# Patient Record
Sex: FEMALE | Race: WHITE
[De-identification: names, ages, dates, MRNs, and addresses within clinical notes are randomized per-mention and may not be internally consistent; named-entity substitution may affect disease eponyms.]

---
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
## 2018-02-28 ENCOUNTER — HOSPITAL ENCOUNTER (OUTPATIENT)
Dept: HOSPITAL 62 - OD | Age: 18
End: 2018-02-28
Payer: MEDICAID

## 2018-02-28 DIAGNOSIS — M25.561: ICD-10-CM

## 2018-02-28 DIAGNOSIS — M25.571: Primary | ICD-10-CM

## 2018-02-28 NOTE — RADIOLOGY REPORT (SQ)
EXAM DESCRIPTION:  KNEE RIGHT 3 VIEWS



COMPLETED DATE/TIME:  2/28/2018 4:10 pm



REASON FOR STUDY:  PAIN IN RIGHT KNEE M25.561  PAIN IN RIGHT KNEE M25.571  PAIN IN RIGHT ANKLE AND ALEJO
INTS OF RIGHT FOOT



COMPARISON:  None.



NUMBER OF VIEWS:  Three views.



TECHNIQUE:  AP, lateral, and sunrise patella radiographic images acquired of the right knee.



LIMITATIONS:  None.



FINDINGS:  MINERALIZATION: Normal.

BONES: No acute fracture or dislocation.  No worrisome bone lesions.

JOINT: No effusion.

SOFT TISSUES: No soft tissue swelling.  No radio-opaque foreign body.

OTHER: No other significant finding.



IMPRESSION:  NEGATIVE STUDY OF THE RIGHT KNEE. NO RADIOGRAPHIC EVIDENCE OF ACUTE INJURY.



TECHNICAL DOCUMENTATION:  JOB ID:  [id number]

 2011 [employer]- All Rights Reserved



Reading location - IP/workstation name: [email or web address]

## 2018-02-28 NOTE — RADIOLOGY REPORT (SQ)
EXAM DESCRIPTION:  ANKLE RIGHT COMPLETE



COMPLETED DATE/TIME:  2/28/2018 4:10 pm



REASON FOR STUDY:  PAIN IN RIGHT ANKLE AND JOINTS OF RIGHT FOOT M25.561  PAIN IN RIGHT KNEE M25.571  
PAIN IN RIGHT ANKLE AND JOINTS OF RIGHT FOOT



COMPARISON:  None.



NUMBER OF VIEWS:  Three views.



TECHNIQUE:  AP, lateral, and oblique radiographic images acquired of the right ankle.



LIMITATIONS:  None.



FINDINGS:  MINERALIZATION: Normal.

BONES: No acute fracture or dislocation.  No worrisome bone lesions.

JOINTS: No effusions.

SOFT TISSUES: No soft tissue swelling. No foreign body.

OTHER: No other significant finding.



IMPRESSION:  NEGATIVE STUDY OF THE RIGHT ANKLE. NO RADIOGRAPHIC EVIDENCE OF ACUTE INJURY.



TECHNICAL DOCUMENTATION:  JOB ID:  [id number]

 2011 [employer]- All Rights Reserved



Reading location - IP/workstation name: [email or web address]

## 2018-05-18 ENCOUNTER — HOSPITAL ENCOUNTER (OUTPATIENT)
Dept: HOSPITAL 62 - OD | Age: 18
End: 2018-05-18
Attending: NURSE PRACTITIONER
Payer: MEDICAID

## 2018-05-18 DIAGNOSIS — N91.2: Primary | ICD-10-CM

## 2018-05-18 PROCEDURE — 36415 COLL VENOUS BLD VENIPUNCTURE: CPT

## 2018-05-18 PROCEDURE — 84703 CHORIONIC GONADOTROPIN ASSAY: CPT

## 2019-11-19 ENCOUNTER — HOSPITAL ENCOUNTER (EMERGENCY)
Dept: HOSPITAL 62 - ER | Age: 19
Discharge: HOME | End: 2019-11-19
Payer: MEDICARE

## 2019-11-19 VITALS — SYSTOLIC BLOOD PRESSURE: 137 MMHG | DIASTOLIC BLOOD PRESSURE: 56 MMHG

## 2019-11-19 DIAGNOSIS — E11.9: ICD-10-CM

## 2019-11-19 DIAGNOSIS — D64.9: ICD-10-CM

## 2019-11-19 DIAGNOSIS — Z91.048: ICD-10-CM

## 2019-11-19 DIAGNOSIS — N39.0: ICD-10-CM

## 2019-11-19 DIAGNOSIS — J45.909: ICD-10-CM

## 2019-11-19 DIAGNOSIS — R05: ICD-10-CM

## 2019-11-19 DIAGNOSIS — R30.0: ICD-10-CM

## 2019-11-19 DIAGNOSIS — R11.2: ICD-10-CM

## 2019-11-19 DIAGNOSIS — J06.9: ICD-10-CM

## 2019-11-19 DIAGNOSIS — R51: Primary | ICD-10-CM

## 2019-11-19 LAB
ADD MANUAL DIFF: NO
ALBUMIN SERPL-MCNC: 4 G/DL (ref 3.5–5)
ALP SERPL-CCNC: 95 U/L (ref 38–126)
ANION GAP SERPL CALC-SCNC: 12 MMOL/L (ref 5–19)
APPEARANCE UR: (no result)
APTT PPP: (no result) S
AST SERPL-CCNC: 25 U/L (ref 14–36)
BASOPHILS # BLD AUTO: 0.1 10^3/UL (ref 0–0.2)
BASOPHILS NFR BLD AUTO: 1.4 % (ref 0–2)
BILIRUB DIRECT SERPL-MCNC: 0.1 MG/DL (ref 0–0.4)
BILIRUB SERPL-MCNC: 0.4 MG/DL (ref 0.2–1.3)
BILIRUB UR QL STRIP: NEGATIVE
BUN SERPL-MCNC: 11 MG/DL (ref 7–20)
CALCIUM: 9.2 MG/DL (ref 8.4–10.2)
CHLORIDE SERPL-SCNC: 105 MMOL/L (ref 98–107)
CO2 SERPL-SCNC: 19 MMOL/L (ref 22–30)
EOSINOPHIL # BLD AUTO: 0 10^3/UL (ref 0–0.6)
EOSINOPHIL NFR BLD AUTO: 0 % (ref 0–6)
ERYTHROCYTE [DISTWIDTH] IN BLOOD BY AUTOMATED COUNT: 16.2 % (ref 11.5–14)
GLUCOSE SERPL-MCNC: 83 MG/DL (ref 75–110)
GLUCOSE UR STRIP-MCNC: NEGATIVE MG/DL
HCT VFR BLD CALC: 31.3 % (ref 36–47)
HGB BLD-MCNC: 9.7 G/DL (ref 12–15.5)
KETONES UR STRIP-MCNC: NEGATIVE MG/DL
LYMPHOCYTES # BLD AUTO: 0.4 10^3/UL (ref 0.5–4.7)
LYMPHOCYTES NFR BLD AUTO: 11 % (ref 13–45)
MCH RBC QN AUTO: 20.2 PG (ref 27–33.4)
MCHC RBC AUTO-ENTMCNC: 30.9 G/DL (ref 32–36)
MCV RBC AUTO: 65 FL (ref 80–97)
MONOCYTES # BLD AUTO: 0.7 10^3/UL (ref 0.1–1.4)
MONOCYTES NFR BLD AUTO: 17.8 % (ref 3–13)
NEUTROPHILS # BLD AUTO: 2.8 10^3/UL (ref 1.7–8.2)
NEUTS SEG NFR BLD AUTO: 69.8 % (ref 42–78)
NITRITE UR QL STRIP: NEGATIVE
PH UR STRIP: 5 [PH] (ref 5–9)
PLATELET # BLD: 201 10^3/UL (ref 150–450)
POTASSIUM SERPL-SCNC: 4.4 MMOL/L (ref 3.6–5)
PROT SERPL-MCNC: 7.3 G/DL (ref 6.3–8.2)
PROT UR STRIP-MCNC: 30 MG/DL
RBC # BLD AUTO: 4.79 10^6/UL (ref 3.72–5.28)
SP GR UR STRIP: 1.03
TOTAL CELLS COUNTED % (AUTO): 100 %
UROBILINOGEN UR-MCNC: NEGATIVE MG/DL (ref ?–2)
WBC # BLD AUTO: 3.9 10^3/UL (ref 4–10.5)

## 2019-11-19 PROCEDURE — 93005 ELECTROCARDIOGRAM TRACING: CPT

## 2019-11-19 PROCEDURE — 96361 HYDRATE IV INFUSION ADD-ON: CPT

## 2019-11-19 PROCEDURE — 96365 THER/PROPH/DIAG IV INF INIT: CPT

## 2019-11-19 PROCEDURE — 36415 COLL VENOUS BLD VENIPUNCTURE: CPT

## 2019-11-19 PROCEDURE — 96375 TX/PRO/DX INJ NEW DRUG ADDON: CPT

## 2019-11-19 PROCEDURE — 81001 URINALYSIS AUTO W/SCOPE: CPT

## 2019-11-19 PROCEDURE — 99284 EMERGENCY DEPT VISIT MOD MDM: CPT

## 2019-11-19 PROCEDURE — 80053 COMPREHEN METABOLIC PANEL: CPT

## 2019-11-19 PROCEDURE — 85025 COMPLETE CBC W/AUTO DIFF WBC: CPT

## 2019-11-19 PROCEDURE — 93010 ELECTROCARDIOGRAM REPORT: CPT

## 2019-11-19 PROCEDURE — 71046 X-RAY EXAM CHEST 2 VIEWS: CPT

## 2019-11-19 PROCEDURE — 84703 CHORIONIC GONADOTROPIN ASSAY: CPT

## 2019-11-19 PROCEDURE — 87086 URINE CULTURE/COLONY COUNT: CPT

## 2019-11-19 NOTE — RADIOLOGY REPORT (SQ)
EXAM DESCRIPTION:  CHEST 2 VIEWS



COMPLETED DATE/TIME:  11/19/2019 5:33 pm



REASON FOR STUDY:  cough



COMPARISON:  2/19/2010



EXAM PARAMETERS:  NUMBER OF VIEWS: two views

TECHNIQUE: Digital Frontal and Lateral radiographic views of the chest acquired.

RADIATION DOSE: NA

LIMITATIONS: none



FINDINGS:  LUNGS AND PLEURA: No opacities, masses or pneumothorax. No pleural effusion.

MEDIASTINUM AND HILAR STRUCTURES: No masses or contour abnormalities.

HEART AND VASCULAR STRUCTURES: Heart normal size.  No evidence for failure.

BONES: No acute findings.

HARDWARE: None in the chest.

OTHER: No other significant finding.



IMPRESSION:  1. NO ACUTE RADIOGRAPHIC FINDING IN THE CHEST.



TECHNICAL DOCUMENTATION:  JOB ID:  [id number]

 2011 [employer]- All Rights Reserved



Reading location - IP/workstation name: [email or web address]

## 2019-11-19 NOTE — ER DOCUMENT REPORT
ED Medical Screen (RME)





- General


Chief Complaint: Headache


Stated Complaint: HEADACHE,NAUSEA


Time Seen by Provider: 11/19/19 16:11


Primary Care Provider: 


ENRIQUE CAMPOVERDE FNP-C [Primary Care Provider] - Follow up as needed


Notes: 





Patient is a 18-year-old female with a history of migraines who presents the 

emergency department with a chief complaint of a migraine.  Patient states that 

it started yesterday.  States that it is at her forehead just above both 

eyebrows.  Patient denies any loss of strength, numbness, tingling, or any other

symptoms at this time.  Patient has had some nausea, vomiting, dizziness, and 

blurred vision.  Patient denies any chest pain, abdominal pain, or any other 

symptoms.  Patient has a history of ADHD and pots.





Exam: S1, S2.  Normal rate.  5 out of 5 strength in all extremities.





I have greeted and performed a rapid initial assessment of this patient.  A 

comprehensive ED assessment and evaluation of the patient, analysis of test 

results and completion of medical decision making process will be conducted by 

an additional ED providers.


TRAVEL OUTSIDE OF THE [place] IN LAST 30 DAYS: No





- Related Data


Allergies/Adverse Reactions: 


                                        





adhesive [Adhesive] Allergy (Mild, Verified 11/19/19 16:03)


   rash


datrona patch Allergy (Mild, Uncoded 11/19/19 16:03)


   rash











Past Medical History





- Social History


Chew tobacco use (# tins/day): No


Frequency of alcohol use: None


Drug Abuse: None


Pulmonary Medical History: Reports: Hx Asthma


Neurological Medical History: Reports: Hx Migraine


Endocrine Medical History: Reports: Hx Diabetes Mellitus Type 2 - metformin, Hx 

Hypothyroidism


Renal/ Medical History: Denies: Hx Peritoneal Dialysis


GI Medical History: Reports: Hx Gastroesophageal Reflux Disease


Psychiatric Medical History: Reports: Hx Attention Deficit Hyperactivity 

Disorder, Hx Bipolar Disorder, Hx Depression


Past Surgical History: Reports: Hx Myringotomy, Hx Tonsillectomy





- Immunizations


Immunizations up to date: Yes


Hx Diphtheria, Pertussis, Tetanus Vaccination: No - immunization planned when 

she feels better





Physical Exam





- Vital signs


Vitals: 


                                        











Temp Pulse Resp BP Pulse Ox


 


 98.7 F   86   20   138/76 H  98 


 


 11/19/19 16:01  11/19/19 16:01  11/19/19 16:01  11/19/19 16:01  11/19/19 16:01














Course





- Vital Signs


Vital signs: 


                                        











Temp Pulse Resp BP Pulse Ox


 


 98.7 F   86   20   138/76 H  98 


 


 11/19/19 16:01  11/19/19 16:01  11/19/19 16:01  11/19/19 16:01  11/19/19 16:01














Doctor's Discharge





- Discharge


Referrals: 


ENRIQUE CAMPOVERDE FNP-C [Primary Care Provider] - Follow up as needed

## 2019-11-19 NOTE — ER DOCUMENT REPORT
ED General





- General


Chief Complaint: Headache


Stated Complaint: HEADACHE,NAUSEA


Time Seen by Provider: 11/19/19 16:11


Primary Care Provider: 


ENRIQUE CAMPOVERDE FNP-C [Primary Care Provider] - Follow up as needed


Mode of Arrival: Ambulatory


Information source: Patient


Notes: 





Patient presents complaining of frontal headache that started yesterday.  

Patient states she had nausea and vomiting x5 episodes today.  Patient denies 

any abdominal tenderness.  Patient denies any fever.  Patient does complain of 

cough for the past 3 days.  Patient also reports dysuria for the past 2 days.


TRAVEL OUTSIDE OF THE [place] IN LAST 30 DAYS: No





- HPI


Onset: Other - 3 days


Onset/Duration: Gradual


Quality of pain: Pressure


Pain Level: 5


Associated symptoms: Headache, Nausea, Vomiting.  denies: Chest pain, 

Nonproductive cough, Diarrhea, Fever, Shortness of breath


Exacerbated by: Denies


Relieved by: Denies


Similar symptoms previously: No


Recently seen / treated by doctor: No





- Related Data


Allergies/Adverse Reactions: 


                                        





adhesive [Adhesive] Allergy (Mild, Verified 11/19/19 16:03)


   rash


datrona patch Allergy (Mild, Uncoded 11/19/19 16:03)


   rash











Past Medical History





- General


Information source: Patient





- Social History


Smoking Status: Never Smoker


Chew tobacco use (# tins/day): No


Frequency of alcohol use: None


Drug Abuse: None


Occupation: dishwashing


Lives with: Family


Family History: Reviewed & Not Pertinent


Patient has suicidal ideation: No


Patient has homicidal ideation: No


Pulmonary Medical History: Reports: Hx Asthma


Neurological Medical History: Reports: Hx Migraine


Endocrine Medical History: Reports: Hx Diabetes Mellitus Type 2 - metformin, Hx 

Hypothyroidism


Renal/ Medical History: Denies: Hx Peritoneal Dialysis


GI Medical History: Reports: Hx Gastroesophageal Reflux Disease


Psychiatric Medical History: Reports: Hx Attention Deficit Hyperactivity 

Disorder, Hx Bipolar Disorder, Hx Depression


Past Surgical History: Reports: Hx Myringotomy, Hx Tonsillectomy





- Immunizations


Immunizations up to date: Yes


Hx Diphtheria, Pertussis, Tetanus Vaccination: No - immunization planned when 

she feels better





Review of Systems





- Review of Systems


Constitutional: No symptoms reported.  denies: Fever


EENT: No symptoms reported


Cardiovascular: No symptoms reported.  denies: Chest pain


Respiratory: Cough


Gastrointestinal: Nausea, Vomiting.  denies: Abdominal pain


Genitourinary: Dysuria


Female Genitourinary: No symptoms reported


Musculoskeletal: No symptoms reported.  denies: Back pain


Skin: No symptoms reported


Hematologic/Lymphatic: No symptoms reported


Neurological/Psychological: Headaches.  denies: Confusion, Weakness





Physical Exam





- Vital signs


Vitals: 


                                        











Temp Pulse Resp BP Pulse Ox


 


 98.7 F   86   20   138/76 H  98 


 


 11/19/19 16:01  11/19/19 16:01  11/19/19 16:01  11/19/19 16:01  11/19/19 16:01














- General


General appearance: Appears well, Alert


In distress: None





- HEENT


Head: Normocephalic, Atraumatic


Eyes: Normal


Conjunctiva: Normal


Extraocular movements intact: Yes


Eyelashes: Normal


Pupils: PERRL


Ears: Normal


External canal: Normal


Tympanic membrane: Normal


Nasal: Normal


Mouth/Lips: Normal


Mucous membranes: Normal


Pharynx: Normal.  No: Erythema, Exudate, Tonsillar hypertrophy


Neck: Normal, Supple.  No: Brudzinski, Lymphadenopathy, Meningismus





- Respiratory


Respiratory status: No respiratory distress


Chest status: Nontender


Breath sounds: Nonproductive cough


Chest palpation: Normal





- Cardiovascular


Rhythm: Regular.  No: Tachycardia


Heart sounds: S1 appreciated, S2 appreciated





- Abdominal


Inspection: Morbidly Obese


Distension: No distension


Bowel sounds: Normal


Tenderness: Nontender


Organomegaly: No organomegaly





- Back


Back: Normal, Nontender





- Extremities


General upper extremity: Normal inspection, Normal strength


General lower extremity: Normal inspection, Normal strength





- Neurological


Neuro grossly intact: Yes


Cognition: Normal


Orientation: AAOx4


Claudia Coma Scale Eye Opening: Spontaneous


Claudia Coma Scale Verbal: Oriented


Rake Coma Scale Motor: Obeys Commands


Rake Coma Scale Total: 15


Speech: Normal


Cranial nerves: Normal


Cerebellar coordination: Normal





- Psychological


Associated symptoms: Normal affect, Normal mood





- Skin


Skin Temperature: Warm


Skin Moisture: Dry


Skin Color: Normal





Course





- Re-evaluation


Re-evalutation: 





11/19/19 19:18


Patient reports that nausea and vomiting has resolved and headache pain is 

resolved as well.  Patient does have a UTI and was treated with antibiotics here

 and will be given a prescription to take at home.  Patient advised of 

incidental finding of anemia and encouraged to increase iron in her diet and to 

follow-up with her primary doctor for further evaluation of her anemia.  The 

patient presents with headache without signs of CNS bleed, stroke, infection, or

 other serious etiology.  The patient is neurologically intact.  Given the 

extremely low risk of these diagnoses further testing and evaluation for these 

possibilities does not appear to be indicated at this time.  The patient has 

been instructed to return if the symptoms worsen or change in any way.





- Vital Signs


Vital signs: 


                                        











Temp Pulse Resp BP Pulse Ox


 


 98.7 F   81   17   137/56 H  99 


 


 11/19/19 19:34  11/19/19 19:34  11/19/19 19:34  11/19/19 19:34  11/19/19 19:34














- Laboratory


Result Diagrams: 


                                 11/19/19 17:51





                                 11/19/19 17:51


Laboratory results interpreted by me: 


                                        











  11/19/19 11/19/19 11/19/19





  16:30 17:51 17:51


 


WBC   3.9 L 


 


Hgb   9.7 L 


 


Hct   31.3 L 


 


MCV   65 L 


 


MCH   20.2 L 


 


MCHC   30.9 L 


 


RDW   16.2 H 


 


Lymph % (Auto)   11.0 L 


 


Mono % (Auto)   17.8 H 


 


Absolute Lymphs (auto)   0.4 L 


 


Sodium    136.3 L


 


Carbon Dioxide    19 L


 


Urine Protein  30 H  


 


Ur Leukocyte Esterase  LARGE H  











11/19/19 19:17





                               Labs- Entire Visit











  11/19/19 11/19/19 11/19/19





  16:30 17:51 17:51


 


WBC   3.9 L 


 


RBC   4.79 


 


Hgb   9.7 L 


 


Hct   31.3 L 


 


MCV   65 L 


 


MCH   20.2 L 


 


MCHC   30.9 L 


 


RDW   16.2 H 


 


Plt Count   201 


 


Lymph % (Auto)   11.0 L 


 


Mono % (Auto)   17.8 H 


 


Eos % (Auto)   0.0 


 


Baso % (Auto)   1.4 


 


Absolute Neuts (auto)   2.8 


 


Absolute Lymphs (auto)   0.4 L 


 


Absolute Monos (auto)   0.7 


 


Absolute Eos (auto)   0.0 


 


Absolute Basos (auto)   0.1 


 


Seg Neutrophils %   69.8 


 


Sodium    136.3 L


 


Potassium    4.4


 


Chloride    105


 


Carbon Dioxide    19 L


 


Anion Gap    12


 


BUN    11


 


Creatinine    0.86


 


Est GFR ( Amer)    > 60


 


Est GFR (MDRD) Non-Af    > 60


 


Glucose    83


 


Calcium    9.2


 


Total Bilirubin    0.4


 


Direct Bilirubin    0.1


 


Neonat Total Bilirubin    Not Reportable


 


Neonat Direct Bilirubin    Not Reportable


 


Neonat Indirect Bili    Not Reportable


 


AST    25


 


ALT    20


 


Alkaline Phosphatase    95


 


Total Protein    7.3


 


Albumin    4.0


 


Serum HCG, Qual   


 


Urine Color  ARNOL  


 


Urine Appearance  CLOUDY  


 


Urine pH  5.0  


 


Ur Specific Gravity  1.028  


 


Urine Protein  30 H  


 


Urine Glucose (UA)  NEGATIVE  


 


Urine Ketones  NEGATIVE  


 


Urine Blood  NEGATIVE  


 


Urine Nitrite  NEGATIVE  


 


Urine Bilirubin  NEGATIVE  


 


Urine Urobilinogen  NEGATIVE  


 


Ur Leukocyte Esterase  LARGE H  


 


Urine WBC (Auto)  60  


 


Urine RBC (Auto)  7  


 


Squamous Epi Cells Auto  6  


 


U Non-Squamous Epis Auto  <1  


 


Urine Mucus (Auto)  MANY  


 


Urine Ascorbic Acid  NEGATIVE  














  11/19/19





  17:51


 


WBC 


 


RBC 


 


Hgb 


 


Hct 


 


MCV 


 


MCH 


 


MCHC 


 


RDW 


 


Plt Count 


 


Lymph % (Auto) 


 


Mono % (Auto) 


 


Eos % (Auto) 


 


Baso % (Auto) 


 


Absolute Neuts (auto) 


 


Absolute Lymphs (auto) 


 


Absolute Monos (auto) 


 


Absolute Eos (auto) 


 


Absolute Basos (auto) 


 


Seg Neutrophils % 


 


Sodium 


 


Potassium 


 


Chloride 


 


Carbon Dioxide 


 


Anion Gap 


 


BUN 


 


Creatinine 


 


Est GFR ( Amer) 


 


Est GFR (MDRD) Non-Af 


 


Glucose 


 


Calcium 


 


Total Bilirubin 


 


Direct Bilirubin 


 


Neonat Total Bilirubin 


 


Neonat Direct Bilirubin 


 


Neonat Indirect Bili 


 


AST 


 


ALT 


 


Alkaline Phosphatase 


 


Total Protein 


 


Albumin 


 


Serum HCG, Qual  NEGATIVE


 


Urine Color 


 


Urine Appearance 


 


Urine pH 


 


Ur Specific Gravity 


 


Urine Protein 


 


Urine Glucose (UA) 


 


Urine Ketones 


 


Urine Blood 


 


Urine Nitrite 


 


Urine Bilirubin 


 


Urine Urobilinogen 


 


Ur Leukocyte Esterase 


 


Urine WBC (Auto) 


 


Urine RBC (Auto) 


 


Squamous Epi Cells Auto 


 


U Non-Squamous Epis Auto 


 


Urine Mucus (Auto) 


 


Urine Ascorbic Acid 














- Diagnostic Test


Radiology reviewed: Reports reviewed





- EKG Interpretation by Me


EKG shows normal: Sinus rhythm


Rate: Normal


When compared to previous EKG there are: No significant change


Additional EKG results interpreted by me: 





11/19/19 19:17


No ST elevation, QTc 451





Discharge





- Discharge


Clinical Impression: 


UTI (urinary tract infection)


Qualifiers:


 Urinary tract infection type: site unspecified Hematuria presence: without 

hematuria Qualified Code(s): N39.0 - Urinary tract infection, site not specified





Headache


Qualifiers:


 Headache type: unspecified Headache chronicity pattern: unspecified pattern 

Intractability: not intractable Qualified Code(s): R51 - Headache





Anemia


Qualifiers:


 Anemia type: unspecified type Qualified Code(s): D64.9 - Anemia, unspecified





Upper respiratory infection


Qualifiers:


 URI type: unspecified URI Qualified Code(s): J06.9 - Acute upper respiratory 

infection, unspecified





Condition: Stable


Disposition: HOME, SELF-CARE


Instructions:  Anemia ([facility]), Cephalexin ([facility]), Headache ([facility]), Reglan ([facility]), 

Toradol Injection ([facility]), Upper Respiratory Illness ([facility]), Urinary Tract 

Infection ([facility])


Additional Instructions: 


Return immediately for any new or worsening symptoms





Followup with your primary care provider, call tomorrow to make a followup 

appointment





Increase foods rich in iron in your diet.





Follow-up with your primary doctor to further evaluate your anemia.





Urine culture is pending, we will call if you need any different treatment


Prescriptions: 


Cephalexin Monohydrate [Keflex 500 mg Capsule] 500 mg PO BID 5 Days  capsule


Promethazine HCl [Phenergan 25 mg Tablet] 25 mg PO Q6H PRN #10 tablet


 PRN Reason: 


Forms:  Return to Work


Referrals: 


ENRIQUE CAMPOVERDE FNP-C [Primary Care Provider] - Follow up as needed

## 2020-03-05 ENCOUNTER — HOSPITAL ENCOUNTER (OUTPATIENT)
Dept: HOSPITAL 62 - OD | Age: 20
End: 2020-03-05
Attending: PEDIATRICS
Payer: MEDICARE

## 2020-03-05 DIAGNOSIS — D64.9: Primary | ICD-10-CM

## 2020-03-05 LAB
%HYPO/RBC NFR BLD AUTO: (no result) %
ABSOLUTE RETICS #: 0.1 10^6/UL (ref 0.03–0.12)
ADD MANUAL DIFF: (no result)
ANISOCYTOSIS BLD QL SMEAR: (no result)
BASOPHILS # BLD AUTO: 0 10^3/UL (ref 0–0.2)
BASOPHILS NFR BLD AUTO: 0.7 % (ref 0–2)
EOSINOPHIL # BLD AUTO: 0 10^3/UL (ref 0–0.6)
EOSINOPHIL NFR BLD AUTO: 0.5 % (ref 0–6)
ERYTHROCYTE [DISTWIDTH] IN BLOOD BY AUTOMATED COUNT: 17.5 % (ref 11.5–14)
FERRITIN SERPL-MCNC: 3.65 NG/ML (ref 6.2–137)
HCT VFR BLD CALC: 28.3 % (ref 36–47)
HGB BLD-MCNC: 8.6 G/DL (ref 12–15.5)
IRON SERPL-MCNC: < 10.1 UG/DL (ref 37–170)
LYMPHOCYTES # BLD AUTO: 2.1 10^3/UL (ref 0.5–4.7)
LYMPHOCYTES NFR BLD AUTO: 34.6 % (ref 13–45)
MCH RBC QN AUTO: 19.4 PG (ref 27–33.4)
MCHC RBC AUTO-ENTMCNC: 30.3 G/DL (ref 32–36)
MCV RBC AUTO: 64 FL (ref 80–97)
MONOCYTES # BLD AUTO: 0.7 10^3/UL (ref 0.1–1.4)
MONOCYTES NFR BLD AUTO: 11.7 % (ref 3–13)
NEUTROPHILS # BLD AUTO: 3.3 10^3/UL (ref 1.7–8.2)
NEUTS SEG NFR BLD AUTO: 52.5 % (ref 42–78)
OVALOCYTES BLD QL SMEAR: (no result)
PLATELET # BLD: 245 10^3/UL (ref 150–450)
PLATELET COMMENT: ADEQUATE
POIKILOCYTOSIS BLD QL SMEAR: (no result)
RBC # BLD AUTO: 4.42 10^6/UL (ref 3.72–5.28)
RETICULOCYTE COUNT (AUTO): 2.32 % (ref 0.66–2.85)
TOTAL CELLS COUNTED % (AUTO): 100 %
TOXIC GRANULES BLD QL SMEAR: SLIGHT
WBC # BLD AUTO: 6.2 10^3/UL (ref 4–10.5)

## 2020-03-05 PROCEDURE — 36415 COLL VENOUS BLD VENIPUNCTURE: CPT

## 2020-03-05 PROCEDURE — 83540 ASSAY OF IRON: CPT

## 2020-03-05 PROCEDURE — 82728 ASSAY OF FERRITIN: CPT

## 2020-03-05 PROCEDURE — 85025 COMPLETE CBC W/AUTO DIFF WBC: CPT

## 2020-03-05 PROCEDURE — 85045 AUTOMATED RETICULOCYTE COUNT: CPT

## 2020-03-06 LAB — PATH REV BLD -IMP: (no result)
